# Patient Record
Sex: FEMALE | Race: WHITE | NOT HISPANIC OR LATINO
[De-identification: names, ages, dates, MRNs, and addresses within clinical notes are randomized per-mention and may not be internally consistent; named-entity substitution may affect disease eponyms.]

---

## 2017-01-03 ENCOUNTER — APPOINTMENT (OUTPATIENT)
Dept: ENDOCRINOLOGY | Facility: CLINIC | Age: 27
End: 2017-01-03

## 2017-01-03 VITALS
HEIGHT: 62 IN | WEIGHT: 149 LBS | BODY MASS INDEX: 27.42 KG/M2 | OXYGEN SATURATION: 99 % | HEART RATE: 78 BPM | RESPIRATION RATE: 12 BRPM | DIASTOLIC BLOOD PRESSURE: 80 MMHG | SYSTOLIC BLOOD PRESSURE: 112 MMHG

## 2017-01-03 DIAGNOSIS — Z83.49 FAMILY HISTORY OF OTHER ENDOCRINE, NUTRITIONAL AND METABOLIC DISEASES: ICD-10-CM

## 2017-01-03 DIAGNOSIS — Z78.9 OTHER SPECIFIED HEALTH STATUS: ICD-10-CM

## 2017-01-03 PROBLEM — Z86.19 HISTORY OF INFECTIOUS MONONUCLEOSIS: Status: RESOLVED | Noted: 2017-01-03 | Resolved: 2017-01-03

## 2017-01-03 PROBLEM — Z87.19 HISTORY OF ESOPHAGEAL REFLUX: Status: RESOLVED | Noted: 2017-01-03 | Resolved: 2017-01-03

## 2017-01-05 LAB
25(OH)D3 SERPL-MCNC: 22.5 NG/ML
ALBUMIN SERPL ELPH-MCNC: 4.6 G/DL
ALP BLD-CCNC: 48 U/L
ALT SERPL-CCNC: 10 U/L
ANION GAP SERPL CALC-SCNC: 20 MMOL/L
AST SERPL-CCNC: 15 U/L
BASOPHILS # BLD AUTO: 0.06 K/UL
BASOPHILS NFR BLD AUTO: 0.9 %
BILIRUB SERPL-MCNC: 0.5 MG/DL
BUN SERPL-MCNC: 16 MG/DL
CALCIUM SERPL-MCNC: 9.8 MG/DL
CHLORIDE SERPL-SCNC: 102 MMOL/L
CHOLEST SERPL-MCNC: 259 MG/DL
CHOLEST/HDLC SERPL: 3.8 RATIO
CO2 SERPL-SCNC: 22 MMOL/L
CREAT SERPL-MCNC: 1 MG/DL
EOSINOPHIL # BLD AUTO: 0.18 K/UL
EOSINOPHIL NFR BLD AUTO: 2.6 %
GLUCOSE SERPL-MCNC: 77 MG/DL
HCT VFR BLD CALC: 45.6 %
HDLC SERPL-MCNC: 68 MG/DL
HGB BLD-MCNC: 15.5 G/DL
IMM GRANULOCYTES NFR BLD AUTO: 0.3 %
LDLC SERPL CALC-MCNC: 173 MG/DL
LYMPHOCYTES # BLD AUTO: 1.86 K/UL
LYMPHOCYTES NFR BLD AUTO: 26.4 %
MAN DIFF?: NORMAL
MCHC RBC-ENTMCNC: 30 PG
MCHC RBC-ENTMCNC: 34 GM/DL
MCV RBC AUTO: 88.4 FL
MONOCYTES # BLD AUTO: 0.43 K/UL
MONOCYTES NFR BLD AUTO: 6.1 %
NEUTROPHILS # BLD AUTO: 4.49 K/UL
NEUTROPHILS NFR BLD AUTO: 63.7 %
PLATELET # BLD AUTO: 325 K/UL
POTASSIUM SERPL-SCNC: 4.6 MMOL/L
PROT SERPL-MCNC: 7.8 G/DL
RBC # BLD: 5.16 M/UL
RBC # FLD: 13.2 %
SODIUM SERPL-SCNC: 144 MMOL/L
TRIGL SERPL-MCNC: 90 MG/DL
URATE SERPL-MCNC: 5.3 MG/DL
WBC # FLD AUTO: 7.04 K/UL

## 2018-04-01 ENCOUNTER — TRANSCRIPTION ENCOUNTER (OUTPATIENT)
Age: 28
End: 2018-04-01

## 2018-07-10 ENCOUNTER — TRANSCRIPTION ENCOUNTER (OUTPATIENT)
Age: 28
End: 2018-07-10

## 2018-07-11 ENCOUNTER — TRANSCRIPTION ENCOUNTER (OUTPATIENT)
Age: 28
End: 2018-07-11

## 2019-03-02 ENCOUNTER — EMERGENCY (EMERGENCY)
Facility: HOSPITAL | Age: 29
LOS: 1 days | Discharge: HOME | End: 2019-03-02
Attending: EMERGENCY MEDICINE
Payer: COMMERCIAL

## 2019-03-02 VITALS
HEART RATE: 96 BPM | DIASTOLIC BLOOD PRESSURE: 82 MMHG | SYSTOLIC BLOOD PRESSURE: 125 MMHG | HEIGHT: 62 IN | RESPIRATION RATE: 20 BRPM | TEMPERATURE: 97 F | OXYGEN SATURATION: 99 % | WEIGHT: 145.06 LBS

## 2019-03-02 LAB
ANION GAP SERPL CALC-SCNC: 13 MMOL/L — SIGNIFICANT CHANGE UP (ref 5–17)
BASOPHILS # BLD AUTO: 0 K/UL — SIGNIFICANT CHANGE UP (ref 0–0.2)
BASOPHILS NFR BLD AUTO: 0.6 % — SIGNIFICANT CHANGE UP (ref 0–2)
BUN SERPL-MCNC: 22 MG/DL — HIGH (ref 8–20)
CALCIUM SERPL-MCNC: 8.9 MG/DL — SIGNIFICANT CHANGE UP (ref 8.6–10.2)
CHLORIDE SERPL-SCNC: 103 MMOL/L — SIGNIFICANT CHANGE UP (ref 98–107)
CO2 SERPL-SCNC: 24 MMOL/L — SIGNIFICANT CHANGE UP (ref 22–29)
CREAT SERPL-MCNC: 0.95 MG/DL — SIGNIFICANT CHANGE UP (ref 0.5–1.3)
EOSINOPHIL # BLD AUTO: 0.2 K/UL — SIGNIFICANT CHANGE UP (ref 0–0.5)
EOSINOPHIL NFR BLD AUTO: 2 % — SIGNIFICANT CHANGE UP (ref 0–6)
GLUCOSE SERPL-MCNC: 106 MG/DL — SIGNIFICANT CHANGE UP (ref 70–115)
HCT VFR BLD CALC: 39.9 % — SIGNIFICANT CHANGE UP (ref 37–47)
HGB BLD-MCNC: 14 G/DL — SIGNIFICANT CHANGE UP (ref 12–16)
LYMPHOCYTES # BLD AUTO: 2.7 K/UL — SIGNIFICANT CHANGE UP (ref 1–4.8)
LYMPHOCYTES # BLD AUTO: 33.7 % — SIGNIFICANT CHANGE UP (ref 20–55)
MCHC RBC-ENTMCNC: 29.9 PG — SIGNIFICANT CHANGE UP (ref 27–31)
MCHC RBC-ENTMCNC: 35.1 G/DL — SIGNIFICANT CHANGE UP (ref 32–36)
MCV RBC AUTO: 85.3 FL — SIGNIFICANT CHANGE UP (ref 81–99)
MONOCYTES # BLD AUTO: 0.8 K/UL — SIGNIFICANT CHANGE UP (ref 0–0.8)
MONOCYTES NFR BLD AUTO: 10.3 % — HIGH (ref 3–10)
NEUTROPHILS # BLD AUTO: 4.2 K/UL — SIGNIFICANT CHANGE UP (ref 1.8–8)
NEUTROPHILS NFR BLD AUTO: 53.1 % — SIGNIFICANT CHANGE UP (ref 37–73)
PLATELET # BLD AUTO: 272 K/UL — SIGNIFICANT CHANGE UP (ref 150–400)
POTASSIUM SERPL-MCNC: 3.6 MMOL/L — SIGNIFICANT CHANGE UP (ref 3.5–5.3)
POTASSIUM SERPL-SCNC: 3.6 MMOL/L — SIGNIFICANT CHANGE UP (ref 3.5–5.3)
RBC # BLD: 4.68 M/UL — SIGNIFICANT CHANGE UP (ref 4.4–5.2)
RBC # FLD: 12.2 % — SIGNIFICANT CHANGE UP (ref 11–15.6)
SODIUM SERPL-SCNC: 140 MMOL/L — SIGNIFICANT CHANGE UP (ref 135–145)
WBC # BLD: 8 K/UL — SIGNIFICANT CHANGE UP (ref 4.8–10.8)
WBC # FLD AUTO: 8 K/UL — SIGNIFICANT CHANGE UP (ref 4.8–10.8)

## 2019-03-02 PROCEDURE — 99285 EMERGENCY DEPT VISIT HI MDM: CPT | Mod: 25

## 2019-03-02 PROCEDURE — 70450 CT HEAD/BRAIN W/O DYE: CPT | Mod: 26

## 2019-03-02 PROCEDURE — 72125 CT NECK SPINE W/O DYE: CPT

## 2019-03-02 PROCEDURE — 99284 EMERGENCY DEPT VISIT MOD MDM: CPT

## 2019-03-02 PROCEDURE — 82550 ASSAY OF CK (CPK): CPT

## 2019-03-02 PROCEDURE — 96360 HYDRATION IV INFUSION INIT: CPT

## 2019-03-02 PROCEDURE — 80307 DRUG TEST PRSMV CHEM ANLYZR: CPT

## 2019-03-02 PROCEDURE — 70450 CT HEAD/BRAIN W/O DYE: CPT

## 2019-03-02 PROCEDURE — 84100 ASSAY OF PHOSPHORUS: CPT

## 2019-03-02 PROCEDURE — 85027 COMPLETE CBC AUTOMATED: CPT

## 2019-03-02 PROCEDURE — 83735 ASSAY OF MAGNESIUM: CPT

## 2019-03-02 PROCEDURE — 93010 ELECTROCARDIOGRAM REPORT: CPT

## 2019-03-02 PROCEDURE — 80048 BASIC METABOLIC PNL TOTAL CA: CPT

## 2019-03-02 PROCEDURE — 36415 COLL VENOUS BLD VENIPUNCTURE: CPT

## 2019-03-02 PROCEDURE — 93005 ELECTROCARDIOGRAM TRACING: CPT

## 2019-03-02 RX ORDER — SODIUM CHLORIDE 9 MG/ML
1000 INJECTION INTRAMUSCULAR; INTRAVENOUS; SUBCUTANEOUS
Qty: 0 | Refills: 0 | Status: DISCONTINUED | OUTPATIENT
Start: 2019-03-02 | End: 2019-03-07

## 2019-03-02 RX ADMIN — SODIUM CHLORIDE 1000 MILLILITER(S): 9 INJECTION INTRAMUSCULAR; INTRAVENOUS; SUBCUTANEOUS at 23:47

## 2019-03-02 RX ADMIN — SODIUM CHLORIDE 999 MILLILITER(S): 9 INJECTION INTRAMUSCULAR; INTRAVENOUS; SUBCUTANEOUS at 22:51

## 2019-03-02 NOTE — ED PROVIDER NOTE - NS ED ROS FT
no weight change, no fever or chills  no recent travel, no recent abox, no sick contacts  no recent change in medications  no rash, no bruises  no visual changes no eye discharge  no cough cold or congestion,   no sob, no chest pain  no stress test, no cath  no orthopnea, no pnd  no abd pain, no n/v/d  no endoscopy, no colonoscopy  no hematuria, no change in urinary habits  no joint pain, no deformity  no headache, no paresthesia +syncope

## 2019-03-02 NOTE — ED ADULT NURSE NOTE - INTERVENTIONS DEFINITIONS
Instruct patient to call for assistance/Monitor for mental status changes and reorient to person, place, and time/Stretcher in lowest position, wheels locked, appropriate side rails in place

## 2019-03-02 NOTE — ED PROVIDER NOTE - CLINICAL SUMMARY MEDICAL DECISION MAKING FREE TEXT BOX
29 y/o with no previous hx of drinking, presents post drinking alcohol shot and absinthe, plan to do hydration, check electrolytes, head CT and re-evaluate.

## 2019-03-02 NOTE — ED ADULT TRIAGE NOTE - CHIEF COMPLAINT QUOTE
arrives to er ambulatory with ems states was at bar with friends and family, passed out awoke on ground. pt states hit head denies blood thinners, cooperative a and 0x4

## 2019-03-02 NOTE — ED PROVIDER NOTE - PHYSICAL EXAMINATION
Constitutional: Appears comfortably, talking in full sentences  Head: NC/AT, no swelling  Eyes: EOMI, no swelling  Mouth: mm moist  Neck: supple, trachea is midline  Chest: Bilateral air entry, symmetrical chest expansion, no distress  Heart: S1 S2 distant  Abdomen: abd soft, non tender  Musc/Skel: extremities with no swelling, no deformity, no spine tenderness, distal pulses present  Neuro: AAO*3, follows commands  motor lesvia upper and lower ext 5/5  sensory symm and intact  CN 2-12 grossly intact  no ataxia, no nystagmus  finger to nose, symm lesvia, no pronator drift

## 2019-03-02 NOTE — ED PROVIDER NOTE - CARE PLAN
Principal Discharge DX:	Acute alcoholic intoxication without complication  Secondary Diagnosis:	Fainting

## 2019-03-02 NOTE — ED PROVIDER NOTE - OBJECTIVE STATEMENT
27 y/o F with no pmhx/pshx BIBA to the ED for syncopal episode today. Pt admits to EtOH intake today, states she was at bar drinking absinthe for the first time. Pt had shot of separate alcohol prior to this drink as well. She states that she drank 3/4 of the glass, handed it to friend and then syncopized. Pt states she remembers sitting on the chair and next thing she remembers is waking up on the floor with people around her. Mother states pt does not drink EtOH often. Denies fever, chills, abd pain, n/v/d. No daily medications, no blood thinners. Denies vaginal discharge, urinary sx; pt denies possible pregnancy. Denies illicit drug use.

## 2019-03-03 VITALS
RESPIRATION RATE: 18 BRPM | TEMPERATURE: 98 F | SYSTOLIC BLOOD PRESSURE: 113 MMHG | OXYGEN SATURATION: 100 % | DIASTOLIC BLOOD PRESSURE: 68 MMHG | HEART RATE: 100 BPM

## 2019-03-03 PROCEDURE — 72125 CT NECK SPINE W/O DYE: CPT | Mod: 26

## 2020-01-29 ENCOUNTER — TRANSCRIPTION ENCOUNTER (OUTPATIENT)
Age: 30
End: 2020-01-29

## 2020-10-29 ENCOUNTER — TRANSCRIPTION ENCOUNTER (OUTPATIENT)
Age: 30
End: 2020-10-29

## 2021-01-20 ENCOUNTER — TRANSCRIPTION ENCOUNTER (OUTPATIENT)
Age: 31
End: 2021-01-20

## 2023-04-14 NOTE — ED ADULT NURSE NOTE - NSFALLRSKUNASSIST_ED_ALL_ED
Behavioral Health Adult Initial Assessment    PATIENT: Monisha Miner   MRN: [id number] : 1986  AGE: 35 year old    Review of Systems   Psychiatric/Behavioral: Positive for confusion and dysphoric mood. Negative for agitation, behavioral problems, decreased concentration, hallucinations, self-injury, sleep disturbance and suicidal ideas. The patient is nervous/anxious. The patient is not hyperactive.        Date: 2022    Referred by: Sachin Buitrago    Chief Complaint in Patient's Own Words: \"Really depressed.\"      Frequency/Duration of Symptoms:  Yes Symptoms Frequency/Duration   [x] Sad/Down     [x]  Crying    [] Muscle Tension    [] Hopeless    [] Helpless    [] Feelings of Worthlessness    [x] Social Withdrawal    [x] Irritability    [x] Mood Swings    []   [place]    [] Restlessness/Difficulty Relaxing    [] Early AM Awakening     [] Difficulty Falling Asleep    [] Difficulty Staying Asleep    [] Nightmares    [] Hallucinations/Delusions    [] Paranoia     [] Libido Disruption     [] Problematic Spending/Shopping    [x] Anxiety/Excessive Worry    [] Panic Attacks    [] Obsessions/Compulsions    [x] Loss of Interest in Usual Activities    [] Self Abuse/Cutting/Burning    []  Easily Distracted    []  Inattention    []  Lack of organization     []   Other      Energy:  []  Good  []  Fair  [x]  Poor  Concentration: []  Good  [x]  Fair  []  Poor  Appetite:  []  Increase           []  Weight Gain Amount  Duration:      []  Decrease         []  Weight Loss Amount  Duration:     []  Binging     []  Restricting   []  Purging Behaviors  Further Eating Disorder assessment needed?:  Yes []    No  []    If yes, referred to whom?    Relationship Status:  []  Single    [x]    (How long?) 14 years []  Remarried (How long?)  []    (How long?)   []   (How long?)  []   (# of times)    []  Unmarried Couple      Current living situation:   [x]  Home    []  Apartment    []  Group Home    [] 
none
 Nursing Home    []  Own        []  Rent        EDUCATIONAL HISTORY:  (per patient report) High School    LEARNING DIFFICULTIES:   [] Yes (If yes, explain)                          [x] No     SOCIAL ACTIVITIES (Describe exercise, leisure, recreational activities, hobbies, other interests):     EMPLOYMENT STATUS:  [x]   Employed   [] Unemployed    []  Retired    []  In School (where):       PRESENT EMPLOYMENT:  Place of Employment: [employer]  Position/How Long: , 7 years    JOB SATISFACTION:  [x]  Yes, but frequently calling-in while depressed   []  No      If no, describe:      SERVICE:  []  Yes [x]  No        Deployment: []  Yes  []   No    Honorable Discharge:[]  Yes   []  No  If no to honorable discharge, describe:     FINANCIAL PROBLEMS:  [x]  None    []  Frequent Loans   []  Inability to Pay Loans     []  Poor Credit    []  Income Assistance  []  Mounting Bills   []  Gambling   []  Loss of Property  []  Bankruptcy     LEGAL PROBLEMS:  [x] None  [] Operating While Intoxicated/ Driving Under Influence   []  Emergency custodial  []  Court Stipulation  []  Protective Custody    []  Charges Pending   []  Pending Court Date (when?)  []  On Probation/[place] (when?)   []  Probation/[place] Office/Telephone Number  []  Professional License Revocation     []  Arrests:  Please list:   []  History of Incarceration       []  Divorce/Custody Proceeding    SPIRITUALITY:  Is spirituality part of patient's belief system?   [x] Yes    [] No     []  Unsure      Check which best describes patient's current health:  []   Excellent    []  Good   [x]  Fair    []  Poor    []  Very Poor      CURRENT AND PAST MEDICAL HISTORY:  Check if patient is experiencing or has ever experienced:  []  Abnormal blood pressure      []  Acne     []  Irritable Bowel Syndrome  []  Anemia     []  Kidney or Bladder Problems    []  Arthritis/Rheumatism   []  Liver Disease  []  Asthma     []  Memory Loss  [] Broken Bones    []  
Ervin Garcia LCPC        
no

## 2024-03-06 ENCOUNTER — NON-APPOINTMENT (OUTPATIENT)
Age: 34
End: 2024-03-06

## 2024-04-21 ENCOUNTER — NON-APPOINTMENT (OUTPATIENT)
Age: 34
End: 2024-04-21

## 2024-07-23 NOTE — ED ADULT NURSE NOTE - NSIMPLEMENTINTERV_GEN_ALL_ED
Prep Survey      Flowsheet Row Responses   [facility] patient discharged from? [facility]   Is LACE score < 7 ? Yes   Eligibility [facility]   Date of Admission 07/22/24   Date of Discharge 07/23/24   Discharge Disposition Home or Self Care   Discharge diagnosis Dizziness   Does the patient have one of the following disease processes/diagnoses(primary or secondary)? Other   Does the patient have Home health ordered? Yes   What is the Home health agency?  [facility]   Is there a DME ordered? No   Prep survey completed? Yes            TUNG A - Registered Nurse           Specific interventions were implemented:

## 2024-11-01 ENCOUNTER — NON-APPOINTMENT (OUTPATIENT)
Age: 34
End: 2024-11-01